# Patient Record
Sex: MALE | Race: WHITE | Employment: STUDENT | URBAN - NONMETROPOLITAN AREA
[De-identification: names, ages, dates, MRNs, and addresses within clinical notes are randomized per-mention and may not be internally consistent; named-entity substitution may affect disease eponyms.]

---
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
## 2024-01-22 ENCOUNTER — TELEPHONE (OUTPATIENT)
Dept: FAMILY MEDICINE CLINIC | Facility: CLINIC | Age: 19
End: 2024-01-22

## 2024-01-22 ENCOUNTER — OFFICE VISIT (OUTPATIENT)
Dept: FAMILY MEDICINE CLINIC | Age: 19
End: 2024-01-22
Payer: MEDICAID

## 2024-01-22 VITALS
BODY MASS INDEX: 30.87 KG/M2 | SYSTOLIC BLOOD PRESSURE: 130 MMHG | OXYGEN SATURATION: 97 % | TEMPERATURE: 97.1 F | WEIGHT: 203 LBS | DIASTOLIC BLOOD PRESSURE: 85 MMHG | HEART RATE: 68 BPM

## 2024-01-22 DIAGNOSIS — F90.9 ATTENTION DEFICIT HYPERACTIVITY DISORDER (ADHD), UNSPECIFIED ADHD TYPE: ICD-10-CM

## 2024-01-22 DIAGNOSIS — F41.1 GAD (GENERALIZED ANXIETY DISORDER): ICD-10-CM

## 2024-01-22 DIAGNOSIS — E11.9 TYPE 2 DIABETES MELLITUS WITHOUT COMPLICATION, WITHOUT LONG-TERM CURRENT USE OF INSULIN (HCC): Primary | ICD-10-CM

## 2024-01-22 DIAGNOSIS — E55.9 VITAMIN D DEFICIENCY: ICD-10-CM

## 2024-01-22 PROCEDURE — G8427 DOCREV CUR MEDS BY ELIG CLIN: HCPCS | Performed by: NURSE PRACTITIONER

## 2024-01-22 PROCEDURE — 2022F DILAT RTA XM EVC RTNOPTHY: CPT | Performed by: NURSE PRACTITIONER

## 2024-01-22 PROCEDURE — 3046F HEMOGLOBIN A1C LEVEL >9.0%: CPT | Performed by: NURSE PRACTITIONER

## 2024-01-22 PROCEDURE — 1036F TOBACCO NON-USER: CPT | Performed by: NURSE PRACTITIONER

## 2024-01-22 PROCEDURE — 99214 OFFICE O/P EST MOD 30 MIN: CPT | Performed by: NURSE PRACTITIONER

## 2024-01-22 PROCEDURE — G8417 CALC BMI ABV UP PARAM F/U: HCPCS | Performed by: NURSE PRACTITIONER

## 2024-01-22 PROCEDURE — G8484 FLU IMMUNIZE NO ADMIN: HCPCS | Performed by: NURSE PRACTITIONER

## 2024-01-22 RX ORDER — FLUOXETINE HYDROCHLORIDE 20 MG/1
20 CAPSULE ORAL DAILY
Qty: 30 CAPSULE | Refills: 3 | Status: SHIPPED | OUTPATIENT
Start: 2024-01-22

## 2024-01-22 RX ORDER — ATOMOXETINE 25 MG/1
25 CAPSULE ORAL DAILY
Qty: 30 CAPSULE | Refills: 3 | Status: SHIPPED | OUTPATIENT
Start: 2024-01-22

## 2024-01-22 ASSESSMENT — ENCOUNTER SYMPTOMS
SORE THROAT: 0
RHINORRHEA: 0
CHOKING: 0
WHEEZING: 0
EYE DISCHARGE: 0
BLOOD IN STOOL: 0
DIARRHEA: 0
EYE REDNESS: 0
CONSTIPATION: 0
COUGH: 0

## 2024-01-22 ASSESSMENT — PATIENT HEALTH QUESTIONNAIRE - PHQ9
SUM OF ALL RESPONSES TO PHQ QUESTIONS 1-9: 2
1. LITTLE INTEREST OR PLEASURE IN DOING THINGS: 1
SUM OF ALL RESPONSES TO PHQ QUESTIONS 1-9: 2
2. FEELING DOWN, DEPRESSED OR HOPELESS: 1
SUM OF ALL RESPONSES TO PHQ QUESTIONS 1-9: 2
SUM OF ALL RESPONSES TO PHQ QUESTIONS 1-9: 2
SUM OF ALL RESPONSES TO PHQ9 QUESTIONS 1 & 2: 2

## 2024-01-22 NOTE — PROGRESS NOTES
Daron Vasquez (:  2005) is a 18 y.o. male,Established patient, here for evaluation of the following chief complaint(s):  Discuss Medications (Pts would like to discuss taking Prozac and Vyvanse again. )      ASSESSMENT/PLAN:    ICD-10-CM    1. Type 2 diabetes mellitus without complication, without long-term current use of insulin (HCC)  E11.9 metFORMIN (GLUCOPHAGE) 500 MG tablet     Hemoglobin A1C      2. Vitamin D deficiency  E55.9 Vitamin D 25 Hydroxy      3. NICHOLAS (generalized anxiety disorder)  F41.1 FLUoxetine (PROZAC) 20 MG capsule      4. Attention deficit hyperactivity disorder (ADHD), unspecified ADHD type  F90.9 atomoxetine (STRATTERA) 25 MG capsule      Will start prozac. And straterra. Discussed if this isn't working can check out the [facility] or [facility]z in [place]    Return in about 2 months (around 3/22/2024).    SUBJECTIVE/OBJECTIVE:  HPI  Diabetes  Medication   Currently on metformin    Vit d  We werent able to reach him to tell him we were going to add vit d weekly.     Nicholas  He would like to restart prozac.     Adhd  States that he is having a hard time focusing at work.     Review of Systems   Constitutional:  Negative for appetite change and unexpected weight change.   HENT:  Negative for congestion, ear pain, rhinorrhea and sore throat.    Eyes:  Negative for discharge and redness.   Respiratory:  Negative for cough, choking and wheezing.    Cardiovascular:  Negative for chest pain.   Gastrointestinal:  Negative for blood in stool, constipation and diarrhea.   Genitourinary:  Negative for decreased urine volume and dysuria.   Skin:  Negative for rash.   Neurological:  Negative for weakness.   Hematological:  Negative for adenopathy.   Psychiatric/Behavioral:  Positive for decreased concentration and dysphoric mood. Negative for suicidal ideas. The patient is nervous/anxious.        /85 (Site: Right Upper Arm, Position: Sitting, Cuff Size: Large Adult)   Pulse 68   Temp

## 2024-05-11 DIAGNOSIS — F41.1 GAD (GENERALIZED ANXIETY DISORDER): ICD-10-CM

## 2024-05-13 RX ORDER — FLUOXETINE HYDROCHLORIDE 20 MG/1
20 CAPSULE ORAL DAILY
Qty: 30 CAPSULE | Refills: 11 | Status: SHIPPED | OUTPATIENT
Start: 2024-05-13

## 2024-05-13 NOTE — TELEPHONE ENCOUNTER
Received fax from pharmacy requesting refill on pts medication(s). Pt was last seen in office on 1/22/2024  and has a follow up scheduled for Visit date not found. Will send request to  Latoya Kelley  for authorization.     Requested Prescriptions     Pending Prescriptions Disp Refills    FLUoxetine (PROZAC) 20 MG capsule [Pharmacy Med Name: FLUOXETINE HCL 20 MG CAPS 20 Capsule] 30 capsule 3     Sig: TAKE 1 CAPSULE BY MOUTH DAILY

## 2024-09-18 ENCOUNTER — OFFICE VISIT (OUTPATIENT)
Dept: FAMILY MEDICINE CLINIC | Age: 19
End: 2024-09-18

## 2024-09-18 VITALS
BODY MASS INDEX: 34.67 KG/M2 | HEART RATE: 81 BPM | WEIGHT: 228 LBS | OXYGEN SATURATION: 97 % | DIASTOLIC BLOOD PRESSURE: 80 MMHG | RESPIRATION RATE: 17 BRPM | SYSTOLIC BLOOD PRESSURE: 120 MMHG | TEMPERATURE: 98.3 F

## 2024-09-18 DIAGNOSIS — B34.9 VIRAL ILLNESS: Primary | ICD-10-CM

## 2024-09-18 DIAGNOSIS — E11.9 TYPE 2 DIABETES MELLITUS WITHOUT COMPLICATION, WITHOUT LONG-TERM CURRENT USE OF INSULIN (HCC): ICD-10-CM

## 2024-09-18 LAB
ALBUMIN SERPL-MCNC: 4.3 G/DL (ref 3.5–5.2)
ALP SERPL-CCNC: 53 U/L (ref 40–129)
ALT SERPL-CCNC: 24 U/L (ref 5–41)
ANION GAP SERPL CALCULATED.3IONS-SCNC: 10 MMOL/L (ref 7–19)
AST SERPL-CCNC: 17 U/L (ref 5–40)
BASOPHILS # BLD: 0.1 K/UL (ref 0–0.2)
BASOPHILS NFR BLD: 1.1 % (ref 0–1)
BILIRUB SERPL-MCNC: 0.2 MG/DL (ref 0.2–1.2)
BUN SERPL-MCNC: 14 MG/DL (ref 6–20)
CALCIUM SERPL-MCNC: 9.4 MG/DL (ref 8.6–10)
CHLORIDE SERPL-SCNC: 101 MMOL/L (ref 98–111)
CHOLEST SERPL-MCNC: 206 MG/DL (ref 0–199)
CO2 SERPL-SCNC: 28 MMOL/L (ref 22–29)
CREAT SERPL-MCNC: 0.9 MG/DL (ref 0.7–1.2)
CREAT UR-MCNC: 179.3 MG/DL (ref 39–259)
EOSINOPHIL # BLD: 0.2 K/UL (ref 0–0.6)
EOSINOPHIL NFR BLD: 3.3 % (ref 0–5)
ERYTHROCYTE [DISTWIDTH] IN BLOOD BY AUTOMATED COUNT: 12.4 % (ref 11.5–14.5)
GLUCOSE SERPL-MCNC: 138 MG/DL (ref 70–99)
HBA1C MFR BLD: 6.9 % (ref 4–5.6)
HCT VFR BLD AUTO: 49.7 % (ref 42–52)
HDLC SERPL-MCNC: 28 MG/DL (ref 40–60)
HGB BLD-MCNC: 15.6 G/DL (ref 14–18)
IMM GRANULOCYTES # BLD: 0 K/UL
LDLC SERPL CALC-MCNC: 120 MG/DL
LYMPHOCYTES # BLD: 2.7 K/UL (ref 1.1–4.5)
LYMPHOCYTES NFR BLD: 43.3 % (ref 20–40)
MCH RBC QN AUTO: 28.2 PG (ref 27–31)
MCHC RBC AUTO-ENTMCNC: 31.4 G/DL (ref 33–37)
MCV RBC AUTO: 89.7 FL (ref 80–94)
MICROALBUMIN UR-MCNC: <1.2 MG/DL (ref 0–1.99)
MICROALBUMIN/CREAT UR-RTO: NORMAL MG/G
MONOCYTES # BLD: 0.6 K/UL (ref 0–0.9)
MONOCYTES NFR BLD: 10.2 % (ref 0–10)
NEUTROPHILS # BLD: 2.6 K/UL (ref 1.5–7.5)
NEUTS SEG NFR BLD: 41.6 % (ref 50–65)
PLATELET # BLD AUTO: 294 K/UL (ref 130–400)
PMV BLD AUTO: 9 FL (ref 9.4–12.4)
POTASSIUM SERPL-SCNC: 4.2 MMOL/L (ref 3.5–5)
PROT SERPL-MCNC: 7.4 G/DL (ref 6.4–8.3)
RBC # BLD AUTO: 5.54 M/UL (ref 4.7–6.1)
SODIUM SERPL-SCNC: 139 MMOL/L (ref 136–145)
TRIGL SERPL-MCNC: 291 MG/DL (ref 0–149)
WBC # BLD AUTO: 6.3 K/UL (ref 4.8–10.8)

## 2024-09-18 RX ORDER — ONDANSETRON 4 MG/1
4 TABLET, ORALLY DISINTEGRATING ORAL 3 TIMES DAILY PRN
Qty: 21 TABLET | Refills: 0 | Status: SHIPPED | OUTPATIENT
Start: 2024-09-18

## 2024-09-18 SDOH — ECONOMIC STABILITY: FOOD INSECURITY: WITHIN THE PAST 12 MONTHS, YOU WORRIED THAT YOUR FOOD WOULD RUN OUT BEFORE YOU GOT MONEY TO BUY MORE.: NEVER TRUE

## 2024-09-18 SDOH — ECONOMIC STABILITY: INCOME INSECURITY: HOW HARD IS IT FOR YOU TO PAY FOR THE VERY BASICS LIKE FOOD, HOUSING, MEDICAL CARE, AND HEATING?: NOT HARD AT ALL

## 2024-09-18 SDOH — ECONOMIC STABILITY: FOOD INSECURITY: WITHIN THE PAST 12 MONTHS, THE FOOD YOU BOUGHT JUST DIDN'T LAST AND YOU DIDN'T HAVE MONEY TO GET MORE.: NEVER TRUE

## 2024-09-18 ASSESSMENT — ENCOUNTER SYMPTOMS
COUGH: 0
NAUSEA: 1
WHEEZING: 0
ABDOMINAL PAIN: 0
RHINORRHEA: 0
CHEST TIGHTNESS: 0
SHORTNESS OF BREATH: 0
VOMITING: 1
SORE THROAT: 0
DIARRHEA: 0
FACIAL SWELLING: 0
EYE DISCHARGE: 0
EYE PAIN: 0

## 2024-09-18 ASSESSMENT — PATIENT HEALTH QUESTIONNAIRE - PHQ9
2. FEELING DOWN, DEPRESSED OR HOPELESS: SEVERAL DAYS
SUM OF ALL RESPONSES TO PHQ QUESTIONS 1-9: 2
SUM OF ALL RESPONSES TO PHQ QUESTIONS 1-9: 2
SUM OF ALL RESPONSES TO PHQ9 QUESTIONS 1 & 2: 2
SUM OF ALL RESPONSES TO PHQ QUESTIONS 1-9: 2
1. LITTLE INTEREST OR PLEASURE IN DOING THINGS: SEVERAL DAYS
SUM OF ALL RESPONSES TO PHQ QUESTIONS 1-9: 2

## 2024-09-19 ENCOUNTER — TELEPHONE (OUTPATIENT)
Dept: FAMILY MEDICINE CLINIC | Age: 19
End: 2024-09-19

## 2024-12-04 ENCOUNTER — OFFICE VISIT (OUTPATIENT)
Dept: FAMILY MEDICINE CLINIC | Age: 19
End: 2024-12-04
Payer: COMMERCIAL

## 2024-12-04 VITALS
RESPIRATION RATE: 17 BRPM | DIASTOLIC BLOOD PRESSURE: 80 MMHG | BODY MASS INDEX: 33.95 KG/M2 | TEMPERATURE: 98.5 F | SYSTOLIC BLOOD PRESSURE: 124 MMHG | OXYGEN SATURATION: 97 % | HEART RATE: 61 BPM | WEIGHT: 224 LBS | HEIGHT: 68 IN

## 2024-12-04 DIAGNOSIS — Q67.4 DEVIATED NASAL SEPTUM, CONGENITAL: ICD-10-CM

## 2024-12-04 DIAGNOSIS — E11.9 TYPE 2 DIABETES MELLITUS WITHOUT COMPLICATION, WITHOUT LONG-TERM CURRENT USE OF INSULIN (HCC): Primary | ICD-10-CM

## 2024-12-04 PROCEDURE — 3044F HG A1C LEVEL LT 7.0%: CPT | Performed by: CLINICAL NURSE SPECIALIST

## 2024-12-04 PROCEDURE — 99213 OFFICE O/P EST LOW 20 MIN: CPT | Performed by: CLINICAL NURSE SPECIALIST

## 2024-12-04 SDOH — ECONOMIC STABILITY: FOOD INSECURITY: WITHIN THE PAST 12 MONTHS, YOU WORRIED THAT YOUR FOOD WOULD RUN OUT BEFORE YOU GOT MONEY TO BUY MORE.: NEVER TRUE

## 2024-12-04 SDOH — ECONOMIC STABILITY: FOOD INSECURITY: WITHIN THE PAST 12 MONTHS, THE FOOD YOU BOUGHT JUST DIDN'T LAST AND YOU DIDN'T HAVE MONEY TO GET MORE.: NEVER TRUE

## 2024-12-04 SDOH — ECONOMIC STABILITY: INCOME INSECURITY: HOW HARD IS IT FOR YOU TO PAY FOR THE VERY BASICS LIKE FOOD, HOUSING, MEDICAL CARE, AND HEATING?: NOT HARD AT ALL

## 2024-12-04 ASSESSMENT — PATIENT HEALTH QUESTIONNAIRE - PHQ9
10. IF YOU CHECKED OFF ANY PROBLEMS, HOW DIFFICULT HAVE THESE PROBLEMS MADE IT FOR YOU TO DO YOUR WORK, TAKE CARE OF THINGS AT HOME, OR GET ALONG WITH OTHER PEOPLE: NOT DIFFICULT AT ALL
SUM OF ALL RESPONSES TO PHQ QUESTIONS 1-9: 0
SUM OF ALL RESPONSES TO PHQ9 QUESTIONS 1 & 2: 0
1. LITTLE INTEREST OR PLEASURE IN DOING THINGS: NOT AT ALL
3. TROUBLE FALLING OR STAYING ASLEEP: NOT AT ALL
2. FEELING DOWN, DEPRESSED OR HOPELESS: NOT AT ALL
9. THOUGHTS THAT YOU WOULD BE BETTER OFF DEAD, OR OF HURTING YOURSELF: NOT AT ALL
6. FEELING BAD ABOUT YOURSELF - OR THAT YOU ARE A FAILURE OR HAVE LET YOURSELF OR YOUR FAMILY DOWN: NOT AT ALL
SUM OF ALL RESPONSES TO PHQ QUESTIONS 1-9: 0
5. POOR APPETITE OR OVEREATING: NOT AT ALL
SUM OF ALL RESPONSES TO PHQ QUESTIONS 1-9: 0
4. FEELING TIRED OR HAVING LITTLE ENERGY: NOT AT ALL
SUM OF ALL RESPONSES TO PHQ QUESTIONS 1-9: 0
8. MOVING OR SPEAKING SO SLOWLY THAT OTHER PEOPLE COULD HAVE NOTICED. OR THE OPPOSITE, BEING SO FIGETY OR RESTLESS THAT YOU HAVE BEEN MOVING AROUND A LOT MORE THAN USUAL: NOT AT ALL
7. TROUBLE CONCENTRATING ON THINGS, SUCH AS READING THE NEWSPAPER OR WATCHING TELEVISION: NOT AT ALL

## 2024-12-04 ASSESSMENT — ENCOUNTER SYMPTOMS
SHORTNESS OF BREATH: 0
SORE THROAT: 0
VOMITING: 0
NAUSEA: 0
CHEST TIGHTNESS: 0
EYE PAIN: 0
WHEEZING: 0
RHINORRHEA: 0
EYE DISCHARGE: 0
FACIAL SWELLING: 0
SINUS PRESSURE: 0
COUGH: 0

## 2024-12-04 NOTE — PROGRESS NOTES
Cardiovascular:  Negative for chest pain.   Gastrointestinal:  Negative for nausea and vomiting.   Genitourinary:  Negative for dysuria, frequency and urgency.   Musculoskeletal:  Negative for arthralgias and myalgias.   Neurological:  Negative for weakness, light-headedness and headaches.        OBJECTIVE:  /80 (Site: Left Upper Arm, Position: Sitting, Cuff Size: Medium Adult)   Pulse 61   Temp 98.5 °F (36.9 °C) (Infrared)   Resp 17   Ht 1.727 m (5' 8\")   Wt 101.6 kg (224 lb)   SpO2 97%   BMI 34.06 kg/m²    Physical Exam  Vitals reviewed.   Constitutional:       Appearance: He is well-developed.   HENT:      Head: Normocephalic and atraumatic.      Mouth/Throat:      Pharynx: No oropharyngeal exudate.   Eyes:      General:         Right eye: No discharge.         Left eye: No discharge.      Conjunctiva/sclera: Conjunctivae normal.      Pupils: Pupils are equal, round, and reactive to light.   Cardiovascular:      Rate and Rhythm: Normal rate and regular rhythm.   Pulmonary:      Effort: Pulmonary effort is normal. No respiratory distress.      Breath sounds: Normal breath sounds. No wheezing or rales.   Musculoskeletal:         General: Normal range of motion.      Cervical back: Neck supple.   Lymphadenopathy:      Cervical: No cervical adenopathy.   Skin:     General: Skin is warm and dry.      Findings: No rash.   Neurological:      Mental Status: He is alert and oriented to person, place, and time.         ASSESSMENT/PLAN:  1. 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 Deviated nasal septum, congenital  - Yadiel Hendricks MD, Otolaryngology, [place]          Return if symptoms worsen or fail to improve.

## 2025-04-21 ENCOUNTER — OFFICE VISIT (OUTPATIENT)
Age: 20
End: 2025-04-21
Payer: COMMERCIAL

## 2025-04-21 VITALS
TEMPERATURE: 98.2 F | HEIGHT: 68 IN | SYSTOLIC BLOOD PRESSURE: 126 MMHG | DIASTOLIC BLOOD PRESSURE: 82 MMHG | WEIGHT: 224 LBS | OXYGEN SATURATION: 98 % | BODY MASS INDEX: 33.95 KG/M2 | HEART RATE: 87 BPM

## 2025-04-21 DIAGNOSIS — L60.0 INGROWN TOENAIL OF RIGHT FOOT WITH INFECTION: Primary | ICD-10-CM

## 2025-04-21 PROCEDURE — 99213 OFFICE O/P EST LOW 20 MIN: CPT | Performed by: NURSE PRACTITIONER

## 2025-04-21 RX ORDER — DOXYCYCLINE HYCLATE 100 MG
100 TABLET ORAL 2 TIMES DAILY
Qty: 20 TABLET | Refills: 0 | Status: SHIPPED | OUTPATIENT
Start: 2025-04-21 | End: 2025-05-01

## 2025-04-21 SDOH — ECONOMIC STABILITY: FOOD INSECURITY: WITHIN THE PAST 12 MONTHS, THE FOOD YOU BOUGHT JUST DIDN'T LAST AND YOU DIDN'T HAVE MONEY TO GET MORE.: NEVER TRUE

## 2025-04-21 SDOH — ECONOMIC STABILITY: FOOD INSECURITY: WITHIN THE PAST 12 MONTHS, YOU WORRIED THAT YOUR FOOD WOULD RUN OUT BEFORE YOU GOT MONEY TO BUY MORE.: NEVER TRUE

## 2025-04-21 ASSESSMENT — PATIENT HEALTH QUESTIONNAIRE - PHQ9
2. FEELING DOWN, DEPRESSED OR HOPELESS: NOT AT ALL
SUM OF ALL RESPONSES TO PHQ QUESTIONS 1-9: 0
1. LITTLE INTEREST OR PLEASURE IN DOING THINGS: NOT AT ALL

## 2025-04-21 ASSESSMENT — ENCOUNTER SYMPTOMS: COLOR CHANGE: 1

## 2025-04-21 NOTE — PROGRESS NOTES
SUBJECTIVE:  Right  ingrown toenail   Patient ID: Daron Vasquez is a 20 y.o. male.    HPI:   HPI   Several weeks of ingrown nail  Draining swelling, hard to walk.   No fever.   Has tried to get under neath the nail   Past Medical History:   Diagnosis Date    ADHD (attention deficit hyperactivity disorder)       Prior to Visit Medications    Medication Sig Taking? Authorizing Provider   doxycycline hyclate (VIBRA-TABS) 100 MG tablet Take 1 tablet by mouth 2 times daily for 10 days Yes Amanda Diaz APRN   ondansetron (ZOFRAN-ODT) 4 MG disintegrating tablet Take 1 tablet by mouth 3 times daily as needed for Nausea or Vomiting  Patient not taking: Reported on 4/21/2025  Meera Childs APRN   FLUoxetine (PROZAC) 20 MG capsule TAKE 1 CAPSULE BY MOUTH DAILY  Patient not taking: Reported on 4/21/2025  Darcie Kelley APRN   metFORMIN (GLUCOPHAGE) 500 MG tablet Take 1 tablet by mouth 2 times daily (with meals)  Patient not taking: Reported on 4/21/2025  Alanis Ontiveros APRN      No Known Allergies    Review of Systems   Constitutional:  Negative for fever.   Musculoskeletal:  Positive for gait problem and joint swelling.   Skin:  Positive for color change and wound.       OBJECTIVE:    Physical Exam  Constitutional:       Appearance: Normal appearance. He is well-developed. He is not ill-appearing.   HENT:      Head: Normocephalic and atraumatic.      Right Ear: External ear normal.      Left Ear: External ear normal.      Nose: Nose normal.      Mouth/Throat:      Lips: Pink.      Mouth: Mucous membranes are moist.   Eyes:      General: Lids are normal.      Extraocular Movements: Extraocular movements intact.      Conjunctiva/sclera: Conjunctivae normal.   Cardiovascular:      Rate and Rhythm: Normal rate and regular rhythm.      Heart sounds: Normal heart sounds. No murmur heard.  Pulmonary:      Effort: Pulmonary effort is normal.      Breath sounds: Normal breath sounds.   Musculoskeletal:      Cervical back:
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
## 2025-05-01 ENCOUNTER — OFFICE VISIT (OUTPATIENT)
Age: 20
End: 2025-05-01
Payer: COMMERCIAL

## 2025-05-01 VITALS
BODY MASS INDEX: 31.84 KG/M2 | OXYGEN SATURATION: 98 % | WEIGHT: 215 LBS | SYSTOLIC BLOOD PRESSURE: 126 MMHG | HEIGHT: 69 IN | HEART RATE: 57 BPM | DIASTOLIC BLOOD PRESSURE: 88 MMHG

## 2025-05-01 DIAGNOSIS — L60.0 INGROWN NAIL OF GREAT TOE OF RIGHT FOOT: ICD-10-CM

## 2025-05-01 DIAGNOSIS — L03.031 PARONYCHIA OF GREAT TOE OF RIGHT FOOT: Primary | ICD-10-CM

## 2025-05-01 RX ORDER — LIDOCAINE HYDROCHLORIDE 20 MG/ML
5 INJECTION, SOLUTION INFILTRATION; PERINEURAL ONCE
Status: COMPLETED | OUTPATIENT
Start: 2025-05-01 | End: 2025-05-01

## 2025-05-01 RX ORDER — DOXYCYCLINE 100 MG/1
100 CAPSULE ORAL 2 TIMES DAILY
Qty: 20 CAPSULE | Refills: 0 | Status: SHIPPED | OUTPATIENT
Start: 2025-05-01 | End: 2025-05-11

## 2025-05-01 RX ADMIN — LIDOCAINE HYDROCHLORIDE 5 ML: 20 INJECTION, SOLUTION INFILTRATION; PERINEURAL at 09:38

## 2025-05-01 NOTE — LETTER
May 1, 2025     Patient: Brayden Kingsley   YOB: 2005   Date of Visit: 5/1/2025       To Whom It May Concern:    It is my medical opinion that Brayden Kingsley may return to work on 5/1/2025 . If you have any questions please call the office at [phone].           Sincerely,        GALO Villanueva    CC: No Recipients

## 2025-05-01 NOTE — LETTER
May 1, 2025     Patient: Brayden Kingsley   YOB: 2005   Date of Visit: 5/1/2025       To Whom It May Concern:    It is my medical opinion that Brayden Kingsley may return to work on 5/2/2025 . If you have any questions please call the office at [phone].           Sincerely,        GALO Villanueva    CC: No Recipients

## 2025-05-01 NOTE — PATIENT INSTRUCTIONS
Leave pressure dressing applied today on for at least 8 hours.   After removing the pressure dressing begin daily Epsom salt soaks using warm water for approximately 15-30 minutes and cover area with an antibiotic ointment and a Band-Aid daily after soaks. Continue this for approximately one week.  After the one week, discontinue epsom salt soaks and cover with Band- Aid until completely healed.   If area becomes swollen, reddened or has purulent drainage, contact the office.      Signs of infection including fever, warmth, redness, purulent drainage, malodorous drainage, or red streaks up the foot.
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
## 2025-05-16 ENCOUNTER — OFFICE VISIT (OUTPATIENT)
Age: 20
End: 2025-05-16
Payer: COMMERCIAL

## 2025-05-16 VITALS
HEIGHT: 69 IN | DIASTOLIC BLOOD PRESSURE: 84 MMHG | BODY MASS INDEX: 31.84 KG/M2 | SYSTOLIC BLOOD PRESSURE: 130 MMHG | HEART RATE: 84 BPM | OXYGEN SATURATION: 99 % | WEIGHT: 215 LBS

## 2025-05-16 DIAGNOSIS — S91.209D AVULSION OF TOENAIL, SUBSEQUENT ENCOUNTER: Primary | ICD-10-CM

## 2025-06-16 ENCOUNTER — CLINICAL SUPPORT (OUTPATIENT)
Age: 20
End: 2025-06-16

## 2025-06-16 VITALS — SYSTOLIC BLOOD PRESSURE: 134 MMHG | DIASTOLIC BLOOD PRESSURE: 82 MMHG

## 2025-06-18 ENCOUNTER — CLINICAL SUPPORT (OUTPATIENT)
Age: 20
End: 2025-06-18

## 2025-06-18 VITALS — DIASTOLIC BLOOD PRESSURE: 76 MMHG | SYSTOLIC BLOOD PRESSURE: 126 MMHG

## 2025-07-14 ENCOUNTER — TELEPHONE (OUTPATIENT)
Age: 20
End: 2025-07-14

## 2025-07-14 NOTE — TELEPHONE ENCOUNTER
Patient stopped by today and is requesting a letter be sent to him via [employer] for his . Patient stated that the letter needs to stay that based on his blood pressure readings that he is cleared for service. Please reach out to the patient if there is an issue. Blood pressure readings are in media.

## 2025-07-29 ENCOUNTER — OFFICE VISIT (OUTPATIENT)
Age: 20
End: 2025-07-29
Payer: COMMERCIAL

## 2025-07-29 VITALS
TEMPERATURE: 96.9 F | OXYGEN SATURATION: 98 % | BODY MASS INDEX: 31.6 KG/M2 | WEIGHT: 208.5 LBS | HEART RATE: 57 BPM | HEIGHT: 68 IN | DIASTOLIC BLOOD PRESSURE: 78 MMHG | SYSTOLIC BLOOD PRESSURE: 120 MMHG

## 2025-07-29 DIAGNOSIS — E11.9 TYPE 2 DIABETES MELLITUS WITHOUT COMPLICATION, WITHOUT LONG-TERM CURRENT USE OF INSULIN (HCC): Primary | ICD-10-CM

## 2025-07-29 DIAGNOSIS — E11.9 TYPE 2 DIABETES MELLITUS WITHOUT COMPLICATION, WITHOUT LONG-TERM CURRENT USE OF INSULIN (HCC): ICD-10-CM

## 2025-07-29 DIAGNOSIS — F41.1 GAD (GENERALIZED ANXIETY DISORDER): ICD-10-CM

## 2025-07-29 LAB
ALBUMIN SERPL-MCNC: 4.3 G/DL (ref 3.5–5.2)
ALP SERPL-CCNC: 51 U/L (ref 40–129)
ALT SERPL-CCNC: 27 U/L (ref 10–50)
ANION GAP SERPL CALCULATED.3IONS-SCNC: 9 MMOL/L (ref 8–16)
AST SERPL-CCNC: 19 U/L (ref 10–50)
BASOPHILS # BLD: 0.1 K/UL (ref 0–0.2)
BASOPHILS NFR BLD: 0.8 % (ref 0–1)
BILIRUB SERPL-MCNC: 0.5 MG/DL (ref 0.2–1.2)
BUN SERPL-MCNC: 9 MG/DL (ref 6–20)
CALCIUM SERPL-MCNC: 9.6 MG/DL (ref 8.6–10)
CHLORIDE SERPL-SCNC: 102 MMOL/L (ref 98–107)
CHOLEST SERPL-MCNC: 175 MG/DL (ref 0–199)
CO2 SERPL-SCNC: 26 MMOL/L (ref 22–29)
CREAT SERPL-MCNC: 1 MG/DL (ref 0.7–1.2)
EOSINOPHIL # BLD: 0.3 K/UL (ref 0–0.6)
EOSINOPHIL NFR BLD: 4.8 % (ref 0–5)
ERYTHROCYTE [DISTWIDTH] IN BLOOD BY AUTOMATED COUNT: 12.9 % (ref 11.5–14.5)
GLUCOSE SERPL-MCNC: 138 MG/DL (ref 70–99)
HBA1C MFR BLD: 6.8 % (ref 4–5.6)
HCT VFR BLD AUTO: 49.9 % (ref 42–52)
HDLC SERPL-MCNC: 25 MG/DL (ref 40–60)
HGB BLD-MCNC: 15.5 G/DL (ref 14–18)
IMM GRANULOCYTES # BLD: 0 K/UL
LDLC SERPL CALC-MCNC: 109 MG/DL
LYMPHOCYTES # BLD: 3.5 K/UL (ref 1.1–4.5)
LYMPHOCYTES NFR BLD: 54.7 % (ref 20–40)
MCH RBC QN AUTO: 27.9 PG (ref 27–31)
MCHC RBC AUTO-ENTMCNC: 31.1 G/DL (ref 33–37)
MCV RBC AUTO: 89.9 FL (ref 80–94)
MONOCYTES # BLD: 0.7 K/UL (ref 0–0.9)
MONOCYTES NFR BLD: 10.1 % (ref 0–10)
NEUTROPHILS # BLD: 1.9 K/UL (ref 1.5–7.5)
NEUTS SEG NFR BLD: 29.1 % (ref 50–65)
PLATELET # BLD AUTO: 314 K/UL (ref 130–400)
PMV BLD AUTO: 9.4 FL (ref 9.4–12.4)
POTASSIUM SERPL-SCNC: 3.9 MMOL/L (ref 3.5–5.1)
PROT SERPL-MCNC: 6.9 G/DL (ref 6.4–8.3)
RBC # BLD AUTO: 5.55 M/UL (ref 4.7–6.1)
SODIUM SERPL-SCNC: 137 MMOL/L (ref 136–145)
T4 FREE SERPL-MCNC: 1.1 NG/DL (ref 0.93–1.7)
TRIGL SERPL-MCNC: 205 MG/DL (ref 0–149)
TSH SERPL DL<=0.005 MIU/L-ACNC: 2.77 UIU/ML (ref 0.27–4.2)
WBC # BLD AUTO: 6.4 K/UL (ref 4.8–10.8)

## 2025-07-29 PROCEDURE — 99213 OFFICE O/P EST LOW 20 MIN: CPT | Performed by: NURSE PRACTITIONER

## 2025-07-29 SDOH — ECONOMIC STABILITY: FOOD INSECURITY: WITHIN THE PAST 12 MONTHS, THE FOOD YOU BOUGHT JUST DIDN'T LAST AND YOU DIDN'T HAVE MONEY TO GET MORE.: NEVER TRUE

## 2025-07-29 SDOH — ECONOMIC STABILITY: FOOD INSECURITY: WITHIN THE PAST 12 MONTHS, YOU WORRIED THAT YOUR FOOD WOULD RUN OUT BEFORE YOU GOT MONEY TO BUY MORE.: NEVER TRUE

## 2025-07-29 ASSESSMENT — ENCOUNTER SYMPTOMS
CONSTIPATION: 0
RHINORRHEA: 0
SHORTNESS OF BREATH: 0
DIARRHEA: 0
VOMITING: 0
EYE REDNESS: 0
COUGH: 0
SORE THROAT: 0

## 2025-07-29 NOTE — PROGRESS NOTES
Daron Vasquez (:  2005) is a 20 y.o. male, Established patient, here for evaluation of the following chief complaint(s):  Diabetes Care Management (Needs labs for  service)     Assessment & Plan  1. Diabetes mellitus: Chronic.  - Weight loss from 228 to 208 pounds.  - Blood pressure 120/78 and heart rate 57.  - Last A1c 6.9 on 2024; previous high of 7.1 on 2020.  - Goal to maintain A1c at 6.5 or below to prevent further loss of pancreatic function.  - Blood work ordered to check current A1c levels.  - If A1c is elevated, medication may be resumed to ensure control.    2. Deviated septum: Chronic.  - Chronic difficulty breathing through the nose.    3. Mood disorder: Stable.  - Off Prozac for about a year.  - Moods, anxiety, and depression reported as stable.    Follow-up  - Blood work results expected by mid-afternoon.    Results  Labs   - A1c: 2024, 6.9   - A1c: 2020, 7.1      ICD-10-CM    1. Type 2 diabetes mellitus without complication, without long-term current use of insulin (HCC)  E11.9 CBC with Auto Differential     Comprehensive Metabolic Panel     TSH     T4, Free     Lipid Panel     Albumin/Creatinine Ratio, Urine     Hemoglobin A1C  Recommend ADA diet  Continue exercise   Further recommendations pending lab work      2. IBIS (generalized anxiety disorder)  F41.1 Stable without medications        Return in about 3 months (around 10/29/2025), or if symptoms worsen or fail to improve.       Subjective   History of Present Illness  The patient presents for a diabetic follow-up.    Diabetes Management  - Currently not taking any diabetic medications, previously took metformin 500 mg twice daily  - Last A1c recorded on 2024, with a value of 6.9  - Highest A1c recorded was 7.1 in 2020  - Off metformin for approximately 8 months  - Not monitoring blood sugar levels  - Reports feeling more energetic than before  - Weight decreased from 228 to 208 pounds  - Does not